# Patient Record
Sex: FEMALE | Race: WHITE | NOT HISPANIC OR LATINO | Employment: FULL TIME
[De-identification: names, ages, dates, MRNs, and addresses within clinical notes are randomized per-mention and may not be internally consistent; named-entity substitution may affect disease eponyms.]

---

## 2023-12-04 ENCOUNTER — TRANSCRIBE ORDERS (OUTPATIENT)
Dept: OTHER | Age: 38
End: 2023-12-04

## 2023-12-04 ENCOUNTER — PRE VISIT (OUTPATIENT)
Dept: ONCOLOGY | Facility: CLINIC | Age: 38
End: 2023-12-04
Payer: COMMERCIAL

## 2023-12-04 ENCOUNTER — PATIENT OUTREACH (OUTPATIENT)
Dept: ONCOLOGY | Facility: CLINIC | Age: 38
End: 2023-12-04
Payer: COMMERCIAL

## 2023-12-04 DIAGNOSIS — Z15.01 LI-FRAUMENI SYNDROME: ICD-10-CM

## 2023-12-04 DIAGNOSIS — C50.919 BREAST CANCER (H): Primary | ICD-10-CM

## 2023-12-04 NOTE — PROGRESS NOTES
Writer received referral to Cancer Risk Management/Genetic Counseling.    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 At that time, she was reported as having VUS in TP53 gene. This result was upgraded to Likely Pathogenic by the lab on 11/15/2018. Per genetic counseling in 2018, this does not appear to be a classic LiFraumeni genotype.    Referral reviewed for appropriate plan, and sent to New Patient Scheduling for completion.    Madeline Camacho, RN, BSN  Oncology New Patient Nurse Navigator   [facility]  [phone]

## 2023-12-04 NOTE — TELEPHONE ENCOUNTER
"Action    Action Taken 12/4/23  WT from NPS - pt advised all treatment @ [facility]. Pt advised originally diagnosed in 2016, then again recently. Pt advised genetic testing conducted. Pt advised Chemotherapy, but NO RADIATION.    Dr. Sewell note 12/4/23 Mentions \"Went to [facility] in [place]\" asked pt - pt advised this is in reference to her daughter, and that pt themselves is coming to our clinic for this concern.    Spoke w/ Patricia @ [facility] - advised they have all of pt's imaging. Patricia requested I fax request attn to her.    [employer] Tracking ([facility] Disc): [id number]  2:53 PM      RECORDS STATUS - BREAST    RECORDS REQUESTED FROM: [facility]   DATE REQUESTED:    NOTES DETAILS STATUS   OFFICE NOTE from referring provider CE - [facility] Dr. Wale Sewell   OFFICE NOTE from medical oncologist CE - [facility] Dr. Sewell: 12/4/23   OFFICE NOTE from surgeon CE - [facility] Dr. Miguel Oakes: 12/4/23   DISCHARGE REPORT from the ER CE - [facility] 5/8/22   OPERATIVE REPORT CE - [facility] 11/17/23: Irrigation and Debridement  10/10/23: Skin Sparing Mastectomy  9/8/16: Nipple Sparing Mastectomy   MEDICATION LIST CE [facility]   Infusion CE - [facility] 12/4/23   LABS     PATHOLOGY REPORTS  (Tissue diagnosis, Stage, ER/ME percentage positive and intensity of staining, HER2 IHC, FISH, and all biopsies from breast and any distant metastasis)                 [facility], Reports in CE 10/10/23: [id number]  3/20/23: [id number]  9/8/16: [id number]  4/19/16: [id number]   GENONOMIC TESTING     TYPE:   (Next Generation Sequencing, including Foundation One testing, and Oncotype score) [facility] - Requested 12/4 6/14/16   IMAGING (NEED IMAGES & REPORT)     CT SCANS Requested 12/4 8/4/20: [facility]   MRI Requested 12/4 8/9/23, 4/3/23, 8/27/20, 2/20/17: S   MAMMO Requested 12/4 8/9/23, 3/20/23, 4/4/16, 3/31/16: [facility]   ULTRASOUND " Requested 12/4 11/17/23, 10/9/23, 3/20/23, 6/21/18, 4/24/18, 4/4/16, 3/31/16: [facility]   PET Requested 12/4 8/11/23, 3/27/23: [facility]

## 2023-12-05 NOTE — TELEPHONE ENCOUNTER
Action    Action Taken 12/5/2023 12:12AM GERALDINE     I received pt Janina's genetic records from [facility]. I faxed the recs off to HIM and the NN Team.

## 2023-12-06 NOTE — TELEPHONE ENCOUNTER
Imaging Received  December 6, 2023  3:29 PM  JESSICA   Action: Imaging Disc received from [facility] and given to Grace for upload

## 2023-12-31 ENCOUNTER — HEALTH MAINTENANCE LETTER (OUTPATIENT)
Age: 38
End: 2023-12-31

## 2024-01-24 PROBLEM — R11.0 CHEMOTHERAPY-INDUCED NAUSEA: Status: ACTIVE | Noted: 2023-04-18

## 2024-01-24 PROBLEM — Z15.01 LI-FRAUMENI SYNDROME: Status: ACTIVE | Noted: 2024-01-24

## 2024-01-24 PROBLEM — C50.912 BILATERAL BREAST CANCER (H): Status: ACTIVE | Noted: 2023-10-11

## 2024-01-24 PROBLEM — T45.1X5A CHEMOTHERAPY-INDUCED NAUSEA: Status: ACTIVE | Noted: 2023-04-18

## 2024-01-24 PROBLEM — N95.8 ARTIFICIAL MENOPAUSE: Status: ACTIVE | Noted: 2023-12-04

## 2024-01-24 PROBLEM — Z87.898 HISTORY OF NEOPLASM: Status: ACTIVE | Noted: 2022-09-07

## 2024-01-24 PROBLEM — Z17.0 MALIGNANT NEOPLASM OF UPPER-OUTER QUADRANT OF LEFT BREAST IN FEMALE, ESTROGEN RECEPTOR POSITIVE (H): Status: ACTIVE | Noted: 2023-03-20

## 2024-01-24 PROBLEM — E55.9 VITAMIN D DEFICIENCY: Status: ACTIVE | Noted: 2018-06-27

## 2024-01-24 PROBLEM — C50.412 MALIGNANT NEOPLASM OF UPPER-OUTER QUADRANT OF LEFT BREAST IN FEMALE, ESTROGEN RECEPTOR POSITIVE (H): Status: ACTIVE | Noted: 2023-03-20

## 2024-01-24 PROBLEM — Z86.32 PERSONAL HISTORY OF GESTATIONAL DIABETES: Status: ACTIVE | Noted: 2022-09-07

## 2024-01-24 PROBLEM — D48.5 NEOPLASM OF UNCERTAIN BEHAVIOR OF SKIN: Status: ACTIVE | Noted: 2021-09-28

## 2024-01-24 PROBLEM — A41.52: Status: ACTIVE | Noted: 2023-12-04

## 2024-01-24 PROBLEM — T85.79XA: Status: ACTIVE | Noted: 2023-12-04

## 2024-01-24 PROBLEM — C50.911 BILATERAL BREAST CANCER (H): Status: ACTIVE | Noted: 2023-10-11

## 2024-01-24 NOTE — PROGRESS NOTES
"Oncology Risk Management Consultation:  Date on this visit: 1/26/2024    Janina Escobedo  is self referred for an oncology risk management consultation. 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  She is considered to be at high risk for soft tissue sarcomas, osteosarcomas, breast cancer, brain tumors, adrenocortical carcinoma, leukemia and other malignancies. Unfortunately, she is s/p invasive carcinoma of the both breasts, which was treated with a bilateral mastectomy (2016 and 2023) at [facility], followed by Dr. Wale Santana. See outside notes for details. She is here today with her , Eric. Also present at this visit is Liliya Fink DNP, APRN-CNS    Primary Physician: Elizabeth Leschensky, CNP    History Of Present Illness:  Ms. Escobedo is a 38 year old female who presents with Li Fraumeni Syndrome.      Genetic testing:  She carries a upgraded \"likely pathogenic TP53 gene mutation (c.322T>C)\" identified using a  Custom 17 gene breast panel on June 14, 2014  through [facility].  Originally the result was reported as a VUS in TP53. This result was upgraded to Likely Pathogenic by the lab on 11/15/2018.       Pertinent History:  April 2016 - Stage IIA [id number] ER/UT +, HER 2 +, grade 2 invasive ductal carcinoma of the right breast measuring 3.3 cm in greatest dimension.    Completed neoadjuvant TCHP chemotherapy (partial response ypT1a N0), right mastectomy with SLN and left contralateral prophylactic mastectomy, and 1 yr of maintenance Herceptin.   Oct 2016 started Tamoxifen as adjuvant endocrine therapy.   August 2020- She was noted to have elevated LFTs.  She underwent systemic imaging with CT C/A/P showing no evidence of recurrence.   Tamoxifen stopped  due to possible drug induced hepatitis. Her LFTs improved off tamoxifen.   Opted to discontinue adjuvant endocrine therapy after reviewing the risks and benefits. Alternate " option of aromatase inhibitor therapy with ovarian suppression was also discussed. Patient opted for observation alone.   6/14/16 patient had 17 gene panel testing when her first right breast cancer was diagnosed. At that time, she was reported as having VUS in TP53 gene. This result was upgraded to Likely Pathogenic by the lab on 11/15/2018. Per genetic counseling in 2018, this does not appear to be a classic LiFraumeni genotype. Whole body MRI deferred by oncology team.     March 2023 patient self detected left breast lump.   3/20/23 diagnostic bilateral tomosynthesis, bilateral breast ultrasound revealed 2cm mass in left reconstructed breast, suspicious left axillary lymph node and right sided clustered calcifications with a minimally suspicious right sided LN. 4 total biopsies were performed that day:  - Left breast: invasive breast carcinoma, Grade 3, ER+, DE+ and HER2 + by IHC  - Left axillary lymph node positive for metastatic disease   - Right breast: at least microinvasive breast carcinoma   - Right axillary lymph node negative   3/27/23 PET scan for systemic staging showed no distant metastatic disease.   4/3/23 diagnostic breast MRI:  - Left reconstructed breast: 3.5 cm biopsy proven malignancy which extends to and contacts the dermis and outer implant capsule. Left axillary lymphadenopathy with level 1 and level 2 lymph nodes.  - Right reconstructed breast: 2 biopsied sites of non mass enhancement, 7:00, the largest of which is 17 mm in contact with the outer implant capsule. 2 additional sites contiguous without her implant capsule at the 6:00 and 9:00 position within the right breast were  suspicious for malignancy as well.  March 2023 Staging:  Left T2N1M0, ER+, DE+, HER2+, grade 3 invasive breast cancer diagnosed   4/4/23 - 8/18/23 neoadjuvant AC-THP chemo.  8/9/23 -- Diagnostic bilateral tomosynthesis, breast MRI and 8/11/23 PET scan all showed good response to neoadjuvant chemo with no new areas of  disease.   10/10/23 Left breast, mastectomy: Pathology showing residual Invasive mammary carcinoma of no special type (ductal), grade 2, tumor size 9 mm in greatest dimension, without significant scarring/neoadjuvant treatment effect, Prior involved left axillary node with segmental scarring and radioactive seed present consistent with clearance of dion disease, and a total of 0/3 sentinel LN involved.   Adjuvant HER2 directed therapy given that she did NOT achieve a pCR, recommended she stop Herceptin/Perjeta, and proceed to adjuvant Kadcyla IV. 1st dose Kadcyla given 11/13/23  Diagnosed with tissue expander site infection on 11/17/23 with pseudomonas species - now sp tissue expander removal and treatment with 7 day course of Ciprofloxacin. No blood cultures drawn.   Continued  adjuvant IV Kadcyla. Expected to complete in July 2024    Other history:  Menarche at age 13.   First child at 29.  Hx of breastfeeding for 6 months.   Hx of hormonal contraceptives.    Premenopausal   Ovaries, fallopian tubes and uterus intact.      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    Allergies:  Allergies as of 01/26/2024 - Reviewed 01/26/2024   Allergen Reaction Noted    Seasonal allergies Other (See Comments) 05/24/2022       Current Medications:  Current Outpatient Medications   Medication Sig Dispense Refill    acetaminophen (TYLENOL) 500 MG tablet Take 1,000 mg by mouth      ADO-trastuzumab emtansine (KADCYLA) 160 MG/8mL injection Inject 360 mg into the vein      cyclobenzaprine (FLEXERIL) 10 MG tablet Take 10 mg by mouth      lidocaine-prilocaine (EMLA) 2.5-2.5 % external cream Apply to port site 1 hour prior to access.      loperamide (IMODIUM A-D) 2 MG tablet Take by mouth See Admin  Instructions Take 2 tablets by mouth with 1st loose stool followed by 1 tab every 2-4 hours as needed with each subsequent loose stool. Maximum of 8 tablets per day.      propranolol (INDERAL) 10 MG tablet Take 10 mg by mouth      venlafaxine (EFFEXOR XR) 75 MG 24 hr capsule Take 75 mg by mouth      ibuprofen (ADVIL/MOTRIN) 200 MG tablet Take 600 mg by mouth          Family History:  Family History   Problem Relation Age of Onset    Diabetes Type 2  Mother     Lung Cancer Paternal Grandmother        Social History:  Social History     Socioeconomic History    Marital status:      Spouse name: Eric    Number of children: 1    Years of education: Not on file    Highest education level: Not on file   Occupational History    Occupation: Certified Nurse's Aide     Comment: [employer], [place], [place]   Tobacco Use    Smoking status: Unknown    Smokeless tobacco: Not on file   Substance and Sexual Activity    Alcohol use: Not on file    Drug use: Not on file    Sexual activity: Yes     Partners: Male   Other Topics Concern    Not on file   Social History Narrative    Not on file     Social Determinants of Health     Financial Resource Strain: Not on file   Food Insecurity: Not on file   Transportation Needs: Not on file   Physical Activity: Not on file   Stress: Not on file   Social Connections: Not on file   Interpersonal Safety: Not on file   Housing Stability: Not on file     Review of Systems:  GENERAL: Reports weight gain, No changes  sleep or appetite.  Low energy.  No fever or chills  EYES: Negative for vision changes or eye problems  ENT: No problems with ears, nose or throat.  No difficulty swallowing.  RESP: No coughing, wheezing or shortness of breath  CV: No chest pains or palpitations  GI: Longstanding history of IBS symptoms including  diarrhea. No constipation or change in bowel habits  : No urinary frequency or dysuria, bladder or kidney problems  MUSCULOSKELETAL: No  "significant muscle or joint pains  NEUROLOGIC: History of headaches dating back to teen years. Last brain MRI in 2022. Reports issues with  numbness, tingling and dizziness, unsure if related to chemotherapy.  PSYCHIATRIC: No problems with anxiety, depression or mental health  HEME/IMMUNE/ALLERGY: No history of bleeding or clotting problems or anemia.  No allergies or immune system problems  ENDOCRINE:History of Graves disease, treated in her 20s, unsure of treatment medication, denies Iodine 131.  No hx of diabetes or other endocrine disorders  SKIN: No rashes,worrisome lesions or skin problems    Physical Exam:  /83 (BP Location: Right arm, Patient Position: Sitting, Cuff Size: Adult Large)   Pulse 98   Temp 97.8  F (36.6  C) (Oral)   Resp 16   Ht 1.745 m (5' 8.7\")   Wt 112.7 kg (248 lb 8 oz)   SpO2 98%   BMI 37.02 kg/m    GENERAL: alert and no distress  EYES: Eyes grossly normal to inspection.  No discharge or erythema, or obvious scleral/conjunctival abnormalities.  RESP: No audible wheeze, cough, or visible cyanosis.    SKIN: Visible skin clear. No significant rash, abnormal pigmentation or lesions.  NEURO: Cranial nerves grossly intact.  Mentation and speech appropriate for age.  PSYCH: Appropriate affect, tone, and pace of words    Laboratory/Imaging Studies  No results found for any visits on 01/26/24.    ASSESSMENT  We reviewed the function of the TP53 gene and its product, tumor protein p53, which can delay cell cycle progression and inhibiting the cell's ability to repair DNA or initiate programmed apoptosis, causing damaged DNA cells to survive and proliferate into malignancies. We reviewed the autosomal dominant pattern of inheritance, whereby children of TP53 mutation carriers would have a 50 percent chance of inheriting the mutation. We discussed that the lifetime risk of developing cancers has been estimated to be as high as 73% for male carriers and 93% for female carriers. (Praveena et " "al. 2011. Lancet ([id number]). We discussed the history of Li Fraumeni syndrome dating from 1969 in which Ervin Sena and Joseph Fraumeni Jr. described families with the syndrome after reviewing medical records of children with rhabdomyosarcoma.     We reviewed the broad range of malignancies found in the LiFraumeni population (usually under the age of 45) including leukemias, lymphomas, gastrointestinal and lung cancers and melanoma. Individuals with Li-Fraumeni syndrome are at increased risk of developing multiple primary cancers in their lifetime. Research has suggested there may be as high as a 50% chance to develop a second cancer and approximately 33% chance to develop a third cancer. It is not currently clear how the age at first diagnosis, the type of first cancer, or cancer treatment may influence future cancer risks.     There are several \"core\" cancers that are most commonly seen with classic Li-Fraumeni syndrome. The exact lifetime risks for these cancers have not been established, though recent research has attempted to define these risks.  Breast cancer: the lifetime breast cancer risk may be as high as 85%, compared to a 12% lifetime risk in the general population (Nadira et al 2016).  Soft-tissue sarcoma: the lifetime risk may be as high as 70% and 40% for women and men, respectively (Nadira et al 2016).  Brain tumors (astrocytomas, glioblastomas, medulloblastomas, choroid plexus carcinomas [CPC], etc.):  the lifetime risk may be as high as 20% and 30% for women and men, respectively (Nadira et al 2016).  Osteosarcomas:  the lifetime risk may be as high as 10% for both women and men (Nadira et al 2016).  Adrenocortical carcinomas (ACC):  typically a childhood cancer, though it can rarely be seen in adulthood  Leukemia - previous research suggested that acute leukemia was a \"core'\" cancer in Li-Fraumeni syndrome, though more recent data has suggested otherwise.     Several other cancers have also been seen in " families with Li-Fraumeni syndrome, including melanoma (and other skin cancers), lymphoma, gastrointestinal (colorectal, pancreatic, stomach, etc.), kidney, gynecologic (uterine and ovarian), lung (particularly bronchoalveolar), and thyroid cancer. The exact lifetime risks for these cancers are unknown at this time. We discussed that she may have an attenuated form of LiFraumeni, or perhaps a de estelita mutation.  We discussed that some providers may consider her to be mosaic for the TP53 mutation, but it is evident that it is in the germline, as her daughter, Vannessa, is also positive for LiFraumeni.  We discussed the theory of attenuated LiFraumeni in which cancers appear at later ages or only 1-2 types of cancers appear in the family tree.     We discussed that my colleague Liliya Dasilva DNP, would be happy to see her for screening and discussed several options for her.     1.  I placed an order for her to have a brain MRI and a whole body MRI for June, which is when her daughter, Vannessa, will be here for her screenings.    2.  She may elect to participate in the national LiFraumeni study for her screening through the [employer] [place], which would cover her screenings, but not provide treatment for any cancers.      See: (Participation  Li-Fraumeni Syndrome Study (cancer.gov) Persons interested in learning more about the LFS study and determining whether they might be eligible to participate are encouraged to contact the research team by phone ([phone]).   If she  has questions regarding the nature and purpose of this study, and wish to speak directly with one of our team clinicians, she contact Dr. Jesenia Hodges, the principal of the study,  by phone ([phone]).    We also discussed support and information through the LiFraumeni Association:  Home - Li-Fraumeni Syndrome Association: LFS Patient Advocacy  Support  Research  Awareness (lfsassociation.org)     At this point, she will  discuss this with her family and make her decisions.  She also is encouraging her parents to have genetic testing.  She has learned that her mother's side of the family had some female relatives with breast cancer but does not have details and may try to get more information about her family history. She has questions as to whether she should have her ovaries removed vs. Ovarian suppression. We discussed that technically, ovarian cancer is not considered part of the LFS syndrome, although cases have been reported in women in their 30s and she may have benefit from a risk reducing salpingo oophorectomy rather than ovarian suppression.  She should discuss the potential long term effects with her medical team.     Her upcoming Oncology Treatment plan is as follows.   RTC with labs 2/27 at 1p to see Dr. Sewell with Jennydcyla to follow Please arrange goserelin shot at her convenience either here or at [facility]  Labs and Treatment only on 12/26 at [facility] with Kadcyla  RTC with labs to see Ghazala Chery PA-C on 1/16/24 with Kadcyla to follow   RTC with labs 2/27 at 1p to see Dr. Sewell with Eusebio to follow   We discussed that we typically use whole body MRI as imaging surveillance for our LiFraumeni patients.  This is done at 3, 6 and 12 month intervals in lieu of CT or radiation-emitting imaging.    Individualized Surveillance Plan for children and adults  with Li Fraumeni Syndrome    based on NCCN Guidelines Version 1.2024 Gene Reviews, Praveena et al 2016, Dimitrios et al 2017   Beginning at diagnosis   Purpose of screening Test or procedure Last done Next Due   Comprehensive exam every 6-12 months Comprehensive physical exam including neurological exam with high index of suspicion for rare cancers (and second malignancies in cancer survivors)     Discussed    Follow with Elizabeth Leschensky, JIMBO-GÓMEZ     Adrenocortical Carcinoma Ultrasound of the abdomen and pelvis every 3- 4 months (change to annually at age 18)    NA   NA   Adrenocortical Carcinoma    NOTE: Stop at age 18. Every 4 months:  1.Beta hCG (Stop at age 6)  2. Alpha fetoprotein (Stop at age 6)  3. 17-OH-progesterone  4. Testosterone  5. Androstenedione  6. Dehydroepiandrosterone sulfate   NA   NA   Acute Leukemia Every 4 months:    CBC    Erythrocyte sedimentation rate    lactate dehydrogenase   Defer during treatment   Defer during treatment   Brain Tumors Annual brain MRI with contrast   (without dye if previous are normal) 2022-within normal limits, enlarged cerebellar tonsils June 2024   Bone and Soft Tissue Sarcomas Annual rapid whole body MRI (without contrast) None to date    Beginning at age 18   From the ages of 20-or at the age of earliest diagnosed breast cancer in the family, if younger than 20. Clinical breast exam every 6-12 months   NA   Follow with Oncology team   Breast screening for women Annual breast MRI from ages 20-29    Alternate annual breast MRI from ages 30-75. (after age 75, consider screening schedule on individual basis) 6 months from whole body MRI   NA   Consider if needed   Melanoma Annual dermatological exam  Set at [facility]   Soft tissue, bone sarcomas and adrenal carcinoma Ultrasound of abdomen and pelvis every 12 months (alternate w/whole body MRI)    December 2024- could use renal US   Colon and Upper GI Screening  Beginning at age 25 or 5 years prior to the earliest known colon cancer in the family Upper endoscopy and colonoscopy every 2-5 years.     None to date     Will have done at [facility]   *Therapeutic radiation treatment for cancer should be avoided when possible as it can promote soft tissue sarcomas in this population.  **Women with TP53 mutation who are treated for breast cancer, screening of remaining breast tissue with annual mammogram and annual breast MRI should continue. Discuss option of  risk reducing mastectomy regarding degree of protection, reconstruction options and risks.  In addition, the family  history and residual breast cancer risk should be considered during counseling.     I spent a total of 101 minutes on the day of the visit. Please see the note for further information on patient assessment and treatment.    JIMBO Katz-CNS, OCN, AGN-BC  Clinical Nurse Specialist  Cancer Risk Management Program  [facility]      CC: Elizabeth Leschensky, APRN-CNP   Wale Ryan DO

## 2024-01-26 ENCOUNTER — ONCOLOGY VISIT (OUTPATIENT)
Dept: ONCOLOGY | Facility: CLINIC | Age: 39
End: 2024-01-26
Attending: CLINICAL NURSE SPECIALIST
Payer: COMMERCIAL

## 2024-01-26 VITALS
RESPIRATION RATE: 16 BRPM | HEIGHT: 69 IN | HEART RATE: 98 BPM | OXYGEN SATURATION: 98 % | TEMPERATURE: 97.8 F | DIASTOLIC BLOOD PRESSURE: 83 MMHG | SYSTOLIC BLOOD PRESSURE: 120 MMHG | WEIGHT: 248.5 LBS | BODY MASS INDEX: 36.81 KG/M2

## 2024-01-26 DIAGNOSIS — Z15.01 LI-FRAUMENI SYNDROME: Primary | ICD-10-CM

## 2024-01-26 DIAGNOSIS — Z12.9 SCREENING FOR CANCER: ICD-10-CM

## 2024-01-26 PROCEDURE — 99213 OFFICE O/P EST LOW 20 MIN: CPT | Performed by: CLINICAL NURSE SPECIALIST

## 2024-01-26 PROCEDURE — 99417 PROLNG OP E/M EACH 15 MIN: CPT | Performed by: CLINICAL NURSE SPECIALIST

## 2024-01-26 PROCEDURE — 99215 OFFICE O/P EST HI 40 MIN: CPT | Performed by: CLINICAL NURSE SPECIALIST

## 2024-01-26 RX ORDER — IBUPROFEN 200 MG
600 TABLET ORAL
COMMUNITY

## 2024-01-26 RX ORDER — LIDOCAINE/PRILOCAINE 2.5 %-2.5%
CREAM (GRAM) TOPICAL
COMMUNITY
Start: 2023-04-04

## 2024-01-26 RX ORDER — VENLAFAXINE HYDROCHLORIDE 75 MG/1
75 CAPSULE, EXTENDED RELEASE ORAL
COMMUNITY
Start: 2023-06-07

## 2024-01-26 RX ORDER — PROPRANOLOL HYDROCHLORIDE 10 MG/1
10 TABLET ORAL
COMMUNITY
Start: 2022-07-14

## 2024-01-26 RX ORDER — ACETAMINOPHEN 500 MG
1000 TABLET ORAL
COMMUNITY
Start: 2023-10-11

## 2024-01-26 RX ORDER — CYCLOBENZAPRINE HCL 10 MG
10 TABLET ORAL
COMMUNITY
Start: 2023-09-12

## 2024-01-26 RX ORDER — LOPERAMIDE HYDROCHLORIDE 2 MG/1
TABLET ORAL
COMMUNITY
Start: 2023-03-30

## 2024-01-26 ASSESSMENT — PAIN SCALES - GENERAL: PAINLEVEL: NO PAIN (0)

## 2024-01-26 NOTE — LETTER
"    1/26/2024         RE: Janina Escobedo  [address]  [facility] [address]        Dear Colleague,    Thank you for referring your patient, Janina Escobedo, to the [facility]. Please see a copy of my visit note below.    Oncology Risk Management Consultation:  Date on this visit: 1/26/2024    Janina Escobedo  is self referred for an oncology risk management consultation. 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  She is considered to be at high risk for soft tissue sarcomas, osteosarcomas, breast cancer, brain tumors, adrenocortical carcinoma, leukemia and other malignancies. Unfortunately, she is s/p invasive carcinoma of the both breasts, which was treated with a bilateral mastectomy (2016 and 2023) at [facility], followed by Dr. Wale Santana. See outside notes for details. She is here today with her , Eric. Also present at this visit is Liliya Fink DNP, APRN-CNS    Primary Physician: Elizabeth Leschensky, CNP    History Of Present Illness:  Ms. Escobedo is a 38 year old female who presents with Li Fraumeni Syndrome.      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  Originally the result was reported as a VUS in TP53. This result was upgraded to Likely Pathogenic by the lab on 11/15/2018.       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    Completed neoadjuvant TCHP chemotherapy (partial response ypT1a N0), right mastectomy with SLN and left contralateral prophylactic mastectomy, and 1 yr of maintenance Herceptin.   Oct 2016 started Tamoxifen as adjuvant endocrine therapy.   August 2020- She was noted to have elevated LFTs.  She underwent systemic imaging " with CT C/A/P showing no evidence of recurrence.   Tamoxifen stopped  due to possible drug induced hepatitis. Her LFTs improved off tamoxifen.   Opted to discontinue adjuvant endocrine therapy after reviewing the risks and benefits. Alternate option of aromatase inhibitor therapy with ovarian suppression was also discussed. Patient opted for observation alone.   6/14/16 patient had 17 gene panel testing when her first right breast cancer was diagnosed. At that time, she was reported as having VUS in TP53 gene. This result was upgraded to Likely Pathogenic by the lab on 11/15/2018. Per genetic counseling in 2018, this does not appear to be a classic LiFraumeni genotype. Whole body MRI deferred by oncology team.     March 2023 patient self detected left breast lump.   3/20/23 diagnostic bilateral tomosynthesis, bilateral breast ultrasound revealed 2cm mass in left reconstructed breast, suspicious left axillary lymph node and right sided clustered calcifications with a minimally suspicious right sided LN. 4 total biopsies were performed that day:  - Left breast: invasive breast carcinoma, Grade 3, ER+, HI+ and HER2 + by IHC  - Left axillary lymph node positive for metastatic disease   - Right breast: at least microinvasive breast carcinoma   - Right axillary lymph node negative   3/27/23 PET scan for systemic staging showed no distant metastatic disease.   4/3/23 diagnostic breast MRI:  - Left reconstructed breast: 3.5 cm biopsy proven malignancy which extends to and contacts the dermis and outer implant capsule. Left axillary lymphadenopathy with level 1 and level 2 lymph nodes.  - Right reconstructed breast: 2 biopsied sites of non mass enhancement, 7:00, the largest of which is 17 mm in contact with the outer implant capsule. 2 additional sites contiguous without her implant capsule at the 6:00 and 9:00 position within the right breast were  suspicious for malignancy as well.  March 2023 Staging:  Left T2N1M0, ER+,  IL+, HER2+, grade 3 invasive breast cancer diagnosed   4/4/23 - 8/18/23 neoadjuvant AC-THP chemo.  8/9/23 -- Diagnostic bilateral tomosynthesis, breast MRI and 8/11/23 PET scan all showed good response to neoadjuvant chemo with no new areas of disease.   10/10/23 Left breast, mastectomy: Pathology showing residual Invasive mammary carcinoma of no special type (ductal), grade 2, tumor size 9 mm in greatest dimension, without significant scarring/neoadjuvant treatment effect, Prior involved left axillary node with segmental scarring and radioactive seed present consistent with clearance of dion disease, and a total of 0/3 sentinel LN involved.   Adjuvant HER2 directed therapy given that she did NOT achieve a pCR, recommended she stop Herceptin/Perjeta, and proceed to adjuvant Kadcyla IV. 1st dose Kadcyla given 11/13/23  Diagnosed with tissue expander site infection on 11/17/23 with pseudomonas species - now sp tissue expander removal and treatment with 7 day course of Ciprofloxacin. No blood cultures drawn.   Continued  adjuvant IV Kadcyla. Expected to complete in July 2024    Other history:  Menarche at age 13.   First child at 29.  Hx of breastfeeding for 6 months.   Hx of hormonal contraceptives.    Premenopausal   Ovaries, fallopian tubes and uterus intact.      Past Medical/Surgical History:  Past Medical History:   Diagnosis Date    Breast cancer (H)     bilateral, recurred in implant post surgically, 2016 and 2023 at [facility]    Graves disease     Li-Fraumeni syndrome 05/01/2018    TP53+     No past surgical history on file.    Allergies:  Allergies as of 01/26/2024 - Reviewed 01/26/2024   Allergen Reaction Noted    Seasonal allergies Other (See Comments) 05/24/2022       Current Medications:  Current Outpatient Medications   Medication Sig Dispense Refill    acetaminophen (TYLENOL) 500 MG tablet Take 1,000 mg by mouth      ADO-trastuzumab emtansine (KADCYLA) 160 MG/8mL injection Inject 360 mg into  the vein      cyclobenzaprine (FLEXERIL) 10 MG tablet Take 10 mg by mouth      lidocaine-prilocaine (EMLA) 2.5-2.5 % external cream Apply to port site 1 hour prior to access.      loperamide (IMODIUM A-D) 2 MG tablet Take by mouth See Admin Instructions Take 2 tablets by mouth with 1st loose stool followed by 1 tab every 2-4 hours as needed with each subsequent loose stool. Maximum of 8 tablets per day.      propranolol (INDERAL) 10 MG tablet Take 10 mg by mouth      venlafaxine (EFFEXOR XR) 75 MG 24 hr capsule Take 75 mg by mouth      ibuprofen (ADVIL/MOTRIN) 200 MG tablet Take 600 mg by mouth          Family History:  Family History   Problem Relation Age of Onset    Diabetes Type 2  Mother     Lung Cancer Paternal Grandmother        Social History:  Social History     Socioeconomic History    Marital status:      Spouse name: Eric    Number of children: 1    Years of education: Not on file    Highest education level: Not on file   Occupational History    Occupation: Certified Nurse's Aide     Comment: [employer], [place], [place]   Tobacco Use    Smoking status: Unknown    Smokeless tobacco: Not on file   Substance and Sexual Activity    Alcohol use: Not on file    Drug use: Not on file    Sexual activity: Yes     Partners: Male   Other Topics Concern    Not on file   Social History Narrative    Not on file     Social Determinants of Health     Financial Resource Strain: Not on file   Food Insecurity: Not on file   Transportation Needs: Not on file   Physical Activity: Not on file   Stress: Not on file   Social Connections: Not on file   Interpersonal Safety: Not on file   Housing Stability: Not on file     Review of Systems:  GENERAL: Reports weight gain, No changes  sleep or appetite.  Low energy.  No fever or chills  EYES: Negative for vision changes or eye problems  ENT: No problems with ears, nose or throat.  No difficulty swallowing.  RESP: No coughing, wheezing or shortness of  "breath  CV: No chest pains or palpitations  GI: Longstanding history of IBS symptoms including  diarrhea. No constipation or change in bowel habits  : No urinary frequency or dysuria, bladder or kidney problems  MUSCULOSKELETAL: No significant muscle or joint pains  NEUROLOGIC: History of headaches dating back to teen years. Last brain MRI in 2022. Reports issues with  numbness, tingling and dizziness, unsure if related to chemotherapy.  PSYCHIATRIC: No problems with anxiety, depression or mental health  HEME/IMMUNE/ALLERGY: No history of bleeding or clotting problems or anemia.  No allergies or immune system problems  ENDOCRINE:History of Graves disease, treated in her 20s, unsure of treatment medication, denies Iodine 131.  No hx of diabetes or other endocrine disorders  SKIN: No rashes,worrisome lesions or skin problems    Physical Exam:  /83 (BP Location: Right arm, Patient Position: Sitting, Cuff Size: Adult Large)   Pulse 98   Temp 97.8  F (36.6  C) (Oral)   Resp 16   Ht 1.745 m (5' 8.7\")   Wt 112.7 kg (248 lb 8 oz)   SpO2 98%   BMI 37.02 kg/m    GENERAL: alert and no distress  EYES: Eyes grossly normal to inspection.  No discharge or erythema, or obvious scleral/conjunctival abnormalities.  RESP: No audible wheeze, cough, or visible cyanosis.    SKIN: Visible skin clear. No significant rash, abnormal pigmentation or lesions.  NEURO: Cranial nerves grossly intact.  Mentation and speech appropriate for age.  PSYCH: Appropriate affect, tone, and pace of words    Laboratory/Imaging Studies  No results found for any visits on 01/26/24.    ASSESSMENT  We reviewed the function of the TP53 gene and its product, tumor protein p53, which can delay cell cycle progression and inhibiting the cell's ability to repair DNA or initiate programmed apoptosis, causing damaged DNA cells to survive and proliferate into malignancies. We reviewed the autosomal dominant pattern of inheritance, whereby children of TP53 " "mutation carriers would have a 50 percent chance of inheriting the mutation. We discussed that the lifetime risk of developing cancers has been estimated to be as high as 73% for male carriers and 93% for female carriers. (Praveena et al. 2011. Lancet 12)042-139). We discussed the history of Li Fraumeni syndrome dating from 1969 in which Ervin Sena and Miguel Juanjocarol Woods described families with the syndrome after reviewing medical records of children with rhabdomyosarcoma.     We reviewed the broad range of malignancies found in the LiFraumeni population (usually under the age of 45) including leukemias, lymphomas, gastrointestinal and lung cancers and melanoma. Individuals with Li-Fraumeni syndrome are at increased risk of developing multiple primary cancers in their lifetime. Research has suggested there may be as high as a 50% chance to develop a second cancer and approximately 33% chance to develop a third cancer. It is not currently clear how the age at first diagnosis, the type of first cancer, or cancer treatment may influence future cancer risks.     There are several \"core\" cancers that are most commonly seen with classic Li-Fraumeni syndrome. The exact lifetime risks for these cancers have not been established, though recent research has attempted to define these risks.  Breast cancer: the lifetime breast cancer risk may be as high as 85%, compared to a 12% lifetime risk in the general population (Nadira et al 2016).  Soft-tissue sarcoma: the lifetime risk may be as high as 70% and 40% for women and men, respectively (Nadira et al 2016).  Brain tumors (astrocytomas, glioblastomas, medulloblastomas, choroid plexus carcinomas [CPC], etc.):  the lifetime risk may be as high as 20% and 30% for women and men, respectively (Nadira et al 2016).  Osteosarcomas:  the lifetime risk may be as high as 10% for both women and men (Nadira et al 2016).  Adrenocortical carcinomas (ACC):  typically a childhood cancer, though it can " "rarely be seen in adulthood  Leukemia - previous research suggested that acute leukemia was a \"core'\" cancer in Li-Fraumeni syndrome, though more recent data has suggested otherwise.     Several other cancers have also been seen in families with Li-Fraumeni syndrome, including melanoma (and other skin cancers), lymphoma, gastrointestinal (colorectal, pancreatic, stomach, etc.), kidney, gynecologic (uterine and ovarian), lung (particularly bronchoalveolar), and thyroid cancer. The exact lifetime risks for these cancers are unknown at this time. We discussed that she may have an attenuated form of LiFraumeni, or perhaps a de estelita mutation.  We discussed that some providers may consider her to be mosaic for the TP53 mutation, but it is evident that it is in the germline, as her daughter, Vannessa, is also positive for LiFraumeni.  We discussed the theory of attenuated LiFraumeni in which cancers appear at later ages or only 1-2 types of cancers appear in the family tree.     We discussed that my colleague Liliya Dasilva, VALERIANO, would be happy to see her for screening and discussed several options for her.     1.  I placed an order for her to have a brain MRI and a whole body MRI for June, which is when her daughter, Vannessa, will be here for her screenings.    2.  She may elect to participate in the national LiFraumeni study for her screening through the [employer], which would cover her screenings, but not provide treatment for any cancers.      See: (Participation  Li-Fraumeni Syndrome Study (cancer.gov) Persons interested in learning more about the LFS study and determining whether they might be eligible to participate are encouraged to contact the research team by phone ([phone]).   If she  has questions regarding the nature and purpose of this study, and wish to speak directly with one of our team clinicians, she contact Dr. Jesenia Hodges, the principal of the study,  by phone " ([phone]).    We also discussed support and information through the LiFraumeni Association:  Home - Li-Fraumeni Syndrome Association: LFS Patient Advocacy  Support  Research  Awareness (lfsassociation.org)     At this point, she will discuss this with her family and make her decisions.  She also is encouraging her parents to have genetic testing.  She has learned that her mother's side of the family had some female relatives with breast cancer but does not have details and may try to get more information about her family history. She has questions as to whether she should have her ovaries removed vs. Ovarian suppression. We discussed that technically, ovarian cancer is not considered part of the LFS syndrome, although cases have been reported in women in their 30s and she may have benefit from a risk reducing salpingo oophorectomy rather than ovarian suppression.  She should discuss the potential long term effects with her medical team.     Her upcoming Oncology Treatment plan is as follows.   RTC with labs 2/27 at 1p to see Dr. Sewell with Jorge Luisylbryan to follow Please arrange goserelin shot at her convenience either here or at [facility]  Labs and Treatment only on 12/26 at [facility] with Kadcyla  RTC with labs to see Ghazala Chery PA-C on 1/16/24 with Kadcyla to follow   RTC with labs 2/27 at 1p to see Dr. Sewell with Eusebio to follow   We discussed that we typically use whole body MRI as imaging surveillance for our LiFraumeni patients.  This is done at 3, 6 and 12 month intervals in lieu of CT or radiation-emitting imaging.    Individualized Surveillance Plan for children and adults  with Li Fraumeni Syndrome    based on NCCN Guidelines Version 1.2024 Gene Reviews, Praveena et al 2016, Dimitrios et al 2017   Beginning at diagnosis   Purpose of screening Test or procedure Last done Next Due   Comprehensive exam every 6-12 months Comprehensive physical exam including neurological exam with high index of  suspicion for rare cancers (and second malignancies in cancer survivors)     Discussed    Follow with Elizabeth Leschensky, APRN-[facility]     Adrenocortical Carcinoma Ultrasound of the abdomen and pelvis every 3- 4 months (change to annually at age 18)   NA   NA   Adrenocortical Carcinoma    NOTE: Stop at age 18. Every 4 months:  1.Beta hCG (Stop at age 6)  2. Alpha fetoprotein (Stop at age 6)  3. 17-OH-progesterone  4. Testosterone  5. Androstenedione  6. Dehydroepiandrosterone sulfate   NA   NA   Acute Leukemia Every 4 months:    CBC    Erythrocyte sedimentation rate    lactate dehydrogenase   Defer during treatment   Defer during treatment   Brain Tumors Annual brain MRI with contrast   (without dye if previous are normal) 2022-within normal limits, enlarged cerebellar tonsils June 2024   Bone and Soft Tissue Sarcomas Annual rapid whole body MRI (without contrast) None to date    Beginning at age 18   From the ages of 20-or at the age of earliest diagnosed breast cancer in the family, if younger than 20. Clinical breast exam every 6-12 months   NA   Follow with Oncology team   Breast screening for women Annual breast MRI from ages 20-29    Alternate annual breast MRI from ages 30-75. (after age 75, consider screening schedule on individual basis) 6 months from whole body MRI   NA   Consider if needed   Melanoma Annual dermatological exam  Set at [facility]   Soft tissue, bone sarcomas and adrenal carcinoma Ultrasound of abdomen and pelvis every 12 months (alternate w/whole body MRI)    December 2024- could use renal US   Colon and Upper GI Screening  Beginning at age 25 or 5 years prior to the earliest known colon cancer in the family Upper endoscopy and colonoscopy every 2-5 years.     None to date     Will have done at [facility]   *Therapeutic radiation treatment for cancer should be avoided when possible as it can promote soft tissue sarcomas in this population.  **Women with TP53 mutation who are treated for  breast cancer, screening of remaining breast tissue with annual mammogram and annual breast MRI should continue. Discuss option of  risk reducing mastectomy regarding degree of protection, reconstruction options and risks.  In addition, the family history and residual breast cancer risk should be considered during counseling.     I spent a total of 101 minutes on the day of the visit. Please see the note for further information on patient assessment and treatment.    Arianne Flores, JIMBO-CNS, OCN, AGN-BC  Clinical Nurse Specialist  Cancer Risk Management Program  [facility]      CC: Elizabeth Leschensky, JIMBO-CNP   Wale Ryan DO

## 2024-01-26 NOTE — NURSING NOTE
"Oncology Rooming Note    January 26, 2024 9:46 AM   Janina Escobedo is a 38 year old female who presents for:    Chief Complaint   Patient presents with    Oncology Clinic Visit     Li-Fraumeni syndrome     Initial Vitals: /83 (BP Location: Right arm, Patient Position: Sitting, Cuff Size: Adult Large)   Pulse 98   Temp 97.8  F (36.6  C) (Oral)   Resp 16   Ht 1.745 m (5' 8.7\")   Wt 112.7 kg (248 lb 8 oz)   SpO2 98%   BMI 37.02 kg/m   Estimated body mass index is 37.02 kg/m  as calculated from the following:    Height as of this encounter: 1.745 m (5' 8.7\").    Weight as of this encounter: 112.7 kg (248 lb 8 oz). Body surface area is 2.34 meters squared.  No Pain (0) Comment: Data Unavailable   No LMP recorded.  Allergies reviewed: Yes  Medications reviewed: Yes    Medications: Medication refills not needed today.  Pharmacy name entered into EPIC: Data Unavailable    Frailty Screening:   Is the patient here for a new oncology consult visit in cancer care? 2. No      Clinical concerns: none       Mara Cavazos              "

## 2024-01-26 NOTE — PATIENT INSTRUCTIONS
Individualized Surveillance Plan for children and adults  with Li Fraumeni Syndrome    based on NCCN Guidelines Version 1.2024 Gene Reviews, Praveena et al 2016, Dimitrios et al 2017   Beginning at diagnosis   Purpose of screening Test or procedure Last done Next Due   Comprehensive exam every 6-12 months Comprehensive physical exam including neurological exam with high index of suspicion for rare cancers (and second malignancies in cancer survivors)     Discussed    Follow with Elizabeth Leschensky, JIMBO-CNP     Adrenocortical Carcinoma Ultrasound of the abdomen and pelvis every 3- 4 months (change to annually at age 18)   NA   NA   Adrenocortical Carcinoma    NOTE: Stop at age 18. Every 4 months:  1.Beta hCG (Stop at age 6)  2. Alpha fetoprotein (Stop at age 6)  3. 17-OH-progesterone  4. Testosterone  5. Androstenedione  6. Dehydroepiandrosterone sulfate   NA   NA   Acute Leukemia Every 4 months:    CBC    Erythrocyte sedimentation rate    lactate dehydrogenase   Defer during treatment   Defer during treatment   Brain Tumors Annual brain MRI with contrast   (without dye if previous are normal) 2022-within normal limits, enlarged cerebellar tonsils June 2024   Bone and Soft Tissue Sarcomas Annual rapid whole body MRI (without contrast) None to date    Beginning at age 18   From the ages of 20-or at the age of earliest diagnosed breast cancer in the family, if younger than 20. Clinical breast exam every 6-12 months   NA   Follow with Oncology team   Breast screening for women Annual breast MRI from ages 20-29    Alternate annual breast MRI from ages 30-75. (after age 75, consider screening schedule on individual basis) 6 months from whole body MRI   NA   Consider if needed   Melanoma Annual dermatological exam  Set at [facility]   Soft tissue, bone sarcomas and adrenal carcinoma Ultrasound of abdomen and pelvis every 12 months (alternate w/whole body MRI)    December 2024- could use renal US   Colon and Upper GI  Screening  Beginning at age 25 or 5 years prior to the earliest known colon cancer in the family Upper endoscopy and colonoscopy every 2-5 years.     None to date     Will have done at [facility]   *Therapeutic radiation treatment for cancer should be avoided when possible as it can promote soft tissue sarcomas in this population.  **Women with TP53 mutation who are treated for breast cancer, screening of remaining breast tissue with annual mammogram and annual breast MRI should continue. Discuss option of  risk reducing mastectomy regarding degree of protection, reconstruction options and risks.  In addition, the family history and residual breast cancer risk should be considered during counseling.

## 2024-02-16 DIAGNOSIS — Z12.9 SCREENING FOR CANCER: Primary | ICD-10-CM

## 2024-02-16 DIAGNOSIS — Z15.01 LI-FRAUMENI SYNDROME: ICD-10-CM

## 2024-04-24 ENCOUNTER — TELEPHONE (OUTPATIENT)
Dept: ONCOLOGY | Facility: CLINIC | Age: 39
End: 2024-04-24
Payer: COMMERCIAL

## 2024-04-24 NOTE — TELEPHONE ENCOUNTER
Called patient to discuss her family history. She updated me that both of her parents tested negative for TP53/Li Fraumeni. She also updated me that she is finishing immunotherpy treatment for breast cancer. She also has breast expanders so will need to postpone the whole body MRI until these are removed. She also updated me that she is planning for a hysterectomy and oophorectomy next year.

## 2024-05-20 ENCOUNTER — TELEPHONE (OUTPATIENT)
Dept: ONCOLOGY | Facility: CLINIC | Age: 39
End: 2024-05-20
Payer: COMMERCIAL

## 2024-05-20 DIAGNOSIS — Z15.01 LI-FRAUMENI SYNDROME: Primary | ICD-10-CM

## 2024-05-20 DIAGNOSIS — Z12.9 SCREENING FOR CANCER: ICD-10-CM

## 2024-05-20 NOTE — TELEPHONE ENCOUNTER
Called to discuss screening plan for Li Fraumeni. Janina reported that she recently had an EGD and colonoscopy, which she reports went well.     We discussed timing of her full body MRI. She still has breast expanders, so cannot have her full body MRI yet. She has a plan to get her expanders out prior to the end of the year. She is working to build up her PTO bank so she has enough paid time off to support her next surgery and recovery.     In the mean time, since she can't get a whole body MRI yet, I have ordered complete abdominal US to eval for soft tissue and bone sarcomas and adrenal carcinomas.

## 2024-05-23 ENCOUNTER — TELEPHONE (OUTPATIENT)
Dept: ONCOLOGY | Facility: CLINIC | Age: 39
End: 2024-05-23
Payer: COMMERCIAL

## 2024-05-24 ENCOUNTER — TELEPHONE (OUTPATIENT)
Dept: ONCOLOGY | Facility: CLINIC | Age: 39
End: 2024-05-24
Payer: COMMERCIAL

## 2024-05-24 NOTE — TELEPHONE ENCOUNTER
Called Janina to see where she wanted the order sent for an abdominal US. She would prefer that I contact her PCP, and request that her PCP order the US so she can have it done in [place].     I called her PCP's office (Elizabeth Leschsnsky, ARNP) and requested that they place an order for a complete abdominal and transvaginal US.

## 2025-01-19 ENCOUNTER — HEALTH MAINTENANCE LETTER (OUTPATIENT)
Age: 40
End: 2025-01-19